# Patient Record
Sex: FEMALE | Race: BLACK OR AFRICAN AMERICAN
[De-identification: names, ages, dates, MRNs, and addresses within clinical notes are randomized per-mention and may not be internally consistent; named-entity substitution may affect disease eponyms.]

---

## 2021-06-20 ENCOUNTER — HOSPITAL ENCOUNTER (EMERGENCY)
Dept: HOSPITAL 8 - ED | Age: 36
LOS: 1 days | Discharge: HOME | End: 2021-06-21
Payer: COMMERCIAL

## 2021-06-20 VITALS — WEIGHT: 158.29 LBS | HEIGHT: 62 IN | BODY MASS INDEX: 29.13 KG/M2

## 2021-06-20 VITALS — DIASTOLIC BLOOD PRESSURE: 79 MMHG | SYSTOLIC BLOOD PRESSURE: 148 MMHG

## 2021-06-20 DIAGNOSIS — T78.1XXA: Primary | ICD-10-CM
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
## 2021-06-20 PROCEDURE — 99284 EMERGENCY DEPT VISIT MOD MDM: CPT

## 2021-06-20 NOTE — NUR
PT C/O ALLERGIC REACTION AFTER EATING SUSHI THAT WAS POSSIBLY PREPPED IN AN 
AREA WHERE SHRIMP IS ALSO PREPPED. PT ALREADY TOOK BENADRYL 25MG. PT C/O LIPS 
AND TONGUE SWELLING. PT SPEAKING IN FULL SENTENCES, NO RESP DISTRESS NOTED. 
SPOUSE AT BEDSIDE.

PT CONNECTED TO ALL MONITORING. CALL LIGHT IN REACH.